# Patient Record
Sex: FEMALE | Race: OTHER | Employment: OTHER | URBAN - METROPOLITAN AREA
[De-identification: names, ages, dates, MRNs, and addresses within clinical notes are randomized per-mention and may not be internally consistent; named-entity substitution may affect disease eponyms.]

---

## 2018-05-12 RX ORDER — EPINEPHRINE 0.1 MG/ML
1 INJECTION INTRACARDIAC; INTRAVENOUS
Status: CANCELLED | OUTPATIENT
Start: 2018-05-12 | End: 2018-05-12

## 2018-05-12 RX ORDER — DEXTROMETHORPHAN/PSEUDOEPHED 2.5-7.5/.8
1.2 DROPS ORAL
Status: CANCELLED | OUTPATIENT
Start: 2018-05-12

## 2018-05-12 RX ORDER — ATROPINE SULFATE 0.1 MG/ML
0.5 INJECTION INTRAVENOUS
Status: CANCELLED | OUTPATIENT
Start: 2018-05-12 | End: 2018-05-12

## 2018-05-14 ENCOUNTER — HOSPITAL ENCOUNTER (OUTPATIENT)
Age: 73
Setting detail: OUTPATIENT SURGERY
Discharge: HOME | End: 2018-05-14
Attending: INTERNAL MEDICINE | Admitting: INTERNAL MEDICINE
Payer: MEDICARE

## 2018-05-14 VITALS
HEIGHT: 64 IN | BODY MASS INDEX: 42.34 KG/M2 | DIASTOLIC BLOOD PRESSURE: 59 MMHG | HEART RATE: 81 BPM | RESPIRATION RATE: 16 BRPM | WEIGHT: 248 LBS | TEMPERATURE: 95.6 F | SYSTOLIC BLOOD PRESSURE: 118 MMHG | OXYGEN SATURATION: 96 %

## 2018-05-14 PROCEDURE — G0500 MOD SEDAT ENDO SERVICE >5YRS: HCPCS | Performed by: INTERNAL MEDICINE

## 2018-05-14 PROCEDURE — 74011250636 HC RX REV CODE- 250/636: Performed by: INTERNAL MEDICINE

## 2018-05-14 PROCEDURE — 99153 MOD SED SAME PHYS/QHP EA: CPT | Performed by: INTERNAL MEDICINE

## 2018-05-14 PROCEDURE — 76040000007: Performed by: INTERNAL MEDICINE

## 2018-05-14 PROCEDURE — 77030013991 HC SNR POLYP ENDOSC BSC -A: Performed by: INTERNAL MEDICINE

## 2018-05-14 PROCEDURE — 88305 TISSUE EXAM BY PATHOLOGIST: CPT | Performed by: INTERNAL MEDICINE

## 2018-05-14 PROCEDURE — 77030020256 HC SOL INJ NACL 0.9%  500ML: Performed by: INTERNAL MEDICINE

## 2018-05-14 PROCEDURE — 74011250636 HC RX REV CODE- 250/636

## 2018-05-14 RX ORDER — LEVOTHYROXINE SODIUM 25 UG/1
25 TABLET ORAL
COMMUNITY

## 2018-05-14 RX ORDER — GUAIFENESIN 100 MG/5ML
81 LIQUID (ML) ORAL DAILY
COMMUNITY

## 2018-05-14 RX ORDER — FLUMAZENIL 0.1 MG/ML
0.2 INJECTION INTRAVENOUS
Status: DISCONTINUED | OUTPATIENT
Start: 2018-05-14 | End: 2018-05-14 | Stop reason: HOSPADM

## 2018-05-14 RX ORDER — SODIUM CHLORIDE 9 MG/ML
100 INJECTION, SOLUTION INTRAVENOUS CONTINUOUS
Status: DISCONTINUED | OUTPATIENT
Start: 2018-05-14 | End: 2018-05-14 | Stop reason: HOSPADM

## 2018-05-14 RX ORDER — EZETIMIBE 10 MG/1
10 TABLET ORAL DAILY
COMMUNITY

## 2018-05-14 RX ORDER — NALOXONE HYDROCHLORIDE 0.4 MG/ML
0.4 INJECTION, SOLUTION INTRAMUSCULAR; INTRAVENOUS; SUBCUTANEOUS
Status: DISCONTINUED | OUTPATIENT
Start: 2018-05-14 | End: 2018-05-14 | Stop reason: HOSPADM

## 2018-05-14 RX ORDER — FENTANYL CITRATE 50 UG/ML
100 INJECTION, SOLUTION INTRAMUSCULAR; INTRAVENOUS
Status: DISCONTINUED | OUTPATIENT
Start: 2018-05-14 | End: 2018-05-14 | Stop reason: HOSPADM

## 2018-05-14 RX ORDER — HYDROCHLOROTHIAZIDE 12.5 MG/1
12.5 CAPSULE ORAL DAILY
COMMUNITY

## 2018-05-14 RX ORDER — SIMVASTATIN 40 MG/1
40 TABLET, FILM COATED ORAL
COMMUNITY

## 2018-05-14 RX ORDER — MIDAZOLAM HYDROCHLORIDE 1 MG/ML
.5-5 INJECTION, SOLUTION INTRAMUSCULAR; INTRAVENOUS
Status: DISCONTINUED | OUTPATIENT
Start: 2018-05-14 | End: 2018-05-14 | Stop reason: HOSPADM

## 2018-05-14 RX ORDER — LISINOPRIL 20 MG/1
20 TABLET ORAL DAILY
COMMUNITY

## 2018-05-14 RX ADMIN — SODIUM CHLORIDE 100 ML/HR: 900 INJECTION, SOLUTION INTRAVENOUS at 10:04

## 2018-05-14 NOTE — DISCHARGE INSTRUCTIONS
Azul Ladd  [id number]  1945    COLON DISCHARGE INSTRUCTIONS    Discomfort:  Redness at IV site- apply warm compress to area; if redness or soreness persist- contact your physician  There may be a slight amount of blood passed from the rectum  Gaseous discomfort- walking, belching will help relieve any discomfort  You may not operate a vehicle til the next day. You may not engage in an occupation involving machinery or appliances til the next day. You may not drink alcoholic beverages til the next day. DIET:   High fiber diet. ACTIVITY:  You may not  resume your normal daily activities til the next day. it is recommended that you spend the remainder of the day resting -  avoid any strenuous activity. CALL M.D.  IF ANY SIGN OF:   Increasing pain, nausea, vomiting  Abdominal distension (swelling)  New increased bleeding (oral or rectal)  Fever (chills)  Pain in chest area  Bloody discharge from nose or mouth  Shortness of breath    You may not  take any Advil, Aspirin, Ibuprofen, Motrin, Aleve, or Goodys for 5 days, ONLY  Tylenol as needed for pain. Post procedure diagnosis:  DIVERTICULOSIS, TORTUOUS COLON, POLYPS, HEMORRHOIDS    Follow-up Instructions: Your follow up colonoscopy will be in 5 years. We will notify you the results of your biopsy by letter within 2 weeks. Hemant Marin MD  May 14, 2018         DISCHARGE SUMMARY from Nurse    PATIENT INSTRUCTIONS:    After general anesthesia or intravenous sedation, for 24 hours or while taking prescription Narcotics:  · Limit your activities  · Do not drive and operate hazardous machinery  · Do not make important personal or business decisions  · Do  not drink alcoholic beverages  · If you have not urinated within 8 hours after discharge, please contact your surgeon on call.     Report the following to your surgeon:  · Excessive pain, swelling, redness or odor of or around the surgical area  · Temperature over 100.5  · Nausea and vomiting lasting longer than 4 hours or if unable to take medications  · Any signs of decreased circulation or nerve impairment to extremity: change in color, persistent  numbness, tingling, coldness or increase pain  · Any questions    What to do at Home:  Recommended activity: as above. If you experience any of the following symptoms as above, please follow up with Dr. Ravindra Ramos. *  Please give a list of your current medications to your Primary Care Provider. *  Please update this list whenever your medications are discontinued, doses are      changed, or new medications (including over-the-counter products) are added. *  Please carry medication information at all times in case of emergency situations. These are general instructions for a healthy lifestyle:    No smoking/ No tobacco products/ Avoid exposure to second hand smoke  Surgeon General's Warning:  Quitting smoking now greatly reduces serious risk to your health. Obesity, smoking, and sedentary lifestyle greatly increases your risk for illness    A healthy diet, regular physical exercise & weight monitoring are important for maintaining a healthy lifestyle    You may be retaining fluid if you have a history of heart failure or if you experience any of the following symptoms:  Weight gain of 3 pounds or more overnight or 5 pounds in a week, increased swelling in our hands or feet or shortness of breath while lying flat in bed. Please call your doctor as soon as you notice any of these symptoms; do not wait until your next office visit. Recognize signs and symptoms of STROKE:    F-face looks uneven    A-arms unable to move or move unevenly    S-speech slurred or non-existent    T-time-call 911 as soon as signs and symptoms begin-DO NOT go       Back to bed or wait to see if you get better-TIME IS BRAIN. Warning Signs of HEART ATTACK     Call 911 if you have these symptoms:   Chest discomfort.  Most heart attacks involve discomfort in the center of the chest that lasts more than a few minutes, or that goes away and comes back. It can feel like uncomfortable pressure, squeezing, fullness, or pain.  Discomfort in other areas of the upper body. Symptoms can include pain or discomfort in one or both arms, the back, neck, jaw, or stomach.  Shortness of breath with or without chest discomfort.  Other signs may include breaking out in a cold sweat, nausea, or lightheadedness. Don't wait more than five minutes to call 911 - MINUTES MATTER! Fast action can save your life. Calling 911 is almost always the fastest way to get lifesaving treatment. Emergency Medical Services staff can begin treatment when they arrive -- up to an hour sooner than if someone gets to the hospital by car. The discharge information has been reviewed with the patient and caregiver. The patient and caregiver verbalized understanding. Discharge medications reviewed with the patient and caregiver and appropriate educational materials and side effects teaching were provided.   ___________________________________________________________________________________________________________________________________    Patient armband removed and shredded

## 2018-05-14 NOTE — IP AVS SNAPSHOT
[address] [address] 
[phone] Patient: Liban Rivers MRN: [id number]:[id number] About your hospitalization You were admitted on:  May 14, 2018 You last received care in the:  [facility] ENDOSCOPY You were discharged on:  May 14, 2018 Why you were hospitalized Your primary diagnosis was:  Not on File Follow-up Information None Discharge Orders None A check adrian indicates which time of day the medication should be taken. My Medications ASK your doctor about these medications Instructions Each Dose to Equal  
 Morning Noon Evening Bedtime  
 aspirin 81 mg chewable tablet Your last dose was: Your next dose is: Take 81 mg by mouth daily. 81 mg  
    
   
   
   
  
 hydroCHLOROthiazide 12.5 mg capsule Commonly known as:  Don Brand Your last dose was: Your next dose is: Take 12.5 mg by mouth daily. 12.5 mg  
    
   
   
   
  
 levothyroxine 25 mcg tablet Commonly known as:  SYNTHROID Your last dose was: Your next dose is: Take 25 mcg by mouth Daily (before breakfast). 25 mcg  
    
   
   
   
  
 lisinopril 20 mg tablet Commonly known as:  Pecola Cypher Your last dose was: Your next dose is: Take 20 mg by mouth daily. 20 mg  
    
   
   
   
  
 ZETIA 10 mg tablet Generic drug:  ezetimibe Your last dose was: Your next dose is: Take 10 mg by mouth daily. 10 mg  
    
   
   
   
  
 ZOCOR 40 mg tablet Generic drug:  simvastatin Your last dose was: Your next dose is: Take 40 mg by mouth nightly. 40 mg Discharge Instructions Liban Rivers [id number] 1945 COLON DISCHARGE INSTRUCTIONS Discomfort: Redness at IV site- apply warm compress to area; if redness or soreness persist- contact your physician There may be a slight amount of blood passed from the rectum Gaseous discomfort- walking, belching will help relieve any discomfort You may not operate a vehicle til the next day. You may not engage in an occupation involving machinery or appliances til the next day. You may not drink alcoholic beverages til the next day. DIET: 
 High fiber diet. ACTIVITY: 
You may not  resume your normal daily activities til the next day. it is recommended that you spend the remainder of the day resting -  avoid any strenuous activity. CALL CHERYL Bryan ANY SIGN OF: Increasing pain, nausea, vomiting Abdominal distension (swelling) New increased bleeding (oral or rectal) Fever (chills) Pain in chest area Bloody discharge from nose or mouth Shortness of breath You may not  take any Advil, Aspirin, Ibuprofen, Motrin, Aleve, or Goodys for 5 days, ONLY  Tylenol as needed for pain. Post procedure diagnosis:  DIVERTICULOSIS, TORTUOUS COLON, POLYPS, HEMORRHOIDS Follow-up Instructions: Your follow up colonoscopy will be in 5 years. We will notify you the results of your biopsy by letter within 2 weeks. Burak Noel MD 
May 14, 2018 DISCHARGE SUMMARY from Nurse PATIENT INSTRUCTIONS: 
 
 
F-face looks uneven A-arms unable to move or move unevenly S-speech slurred or non-existent T-time-call 911 as soon as signs and symptoms begin-DO NOT go Back to bed or wait to see if you get better-TIME IS BRAIN. Warning Signs of HEART ATTACK Call 911 if you have these symptoms: 
? Chest discomfort.  Most heart attacks involve discomfort in the center of the chest that lasts more than a few minutes, or that goes away and comes back. It can feel like uncomfortable pressure, squeezing, fullness, or pain. ? Discomfort in other areas of the upper body. Symptoms can include pain or discomfort in one or both arms, the back, neck, jaw, or stomach. ? Shortness of breath with or without chest discomfort. ? Other signs may include breaking out in a cold sweat, nausea, or lightheadedness. Don't wait more than five minutes to call [address]! Fast action can save your life. Calling 911 is almost always the fastest way to get lifesaving treatment. Emergency Medical Services staff can begin treatment when they arrive  up to an hour sooner than if someone gets to the hospital by car. The discharge information has been reviewed with the patient and caregiver. The patient and caregiver verbalized understanding. Discharge medications reviewed with the patient and caregiver and appropriate educational materials and side effects teaching were provided. ___________________________________________________________________________________________________________________________________ Patient armband removed and shredded Introducing [facility]! [employer] introduces [employer] patient portal. Now you can access parts of your medical record, email your doctor's office, and request medication refills online. 1. In your internet browser, go to [email or web address] 2. Click on the First Time User? Click Here link in the Sign In box. You will see the New Member Sign Up page. 3. Enter your [facility] Access Code exactly as it appears below. You will not need to use this code after youve completed the sign-up process. If you do not sign up before the expiration date, you must request a new code. · [facility] Access Code: [email or web address] Expires: 8/12/2018 11:19 AM 
 
 4. Enter the last four digits of your Social Security Number (xxxx) and Date of Birth (mm/dd/yyyy) as indicated and click Submit. You will be taken to the next sign-up page. 5. Create a [employer] ID. This will be your [employer] login ID and cannot be changed, so think of one that is secure and easy to remember. 6. Create a [employer] password. You can change your password at any time. 7. Enter your Password Reset Question and Answer. This can be used at a later time if you forget your password. 8. Enter your e-mail address. You will receive e-mail notification when new information is available in [address] 9. Click Sign Up. You can now view and download portions of your medical record. 10. Click the Download Summary menu link to download a portable copy of your medical information. If you have questions, please visit the Frequently Asked Questions section of the [employer] website. Remember, [employer] is NOT to be used for urgent needs. For medical emergencies, dial 911. Now available from your iPhone and Android! Introducing Jesus Lopez As a [facility] patient, I wanted to make you aware of our electronic visit tool called Jesus Lopez. [facility] 24/7 allows you to connect within minutes with a medical provider 24 hours a day, seven days a week via a mobile device or tablet or logging into a secure website from your computer. You can access Jesus Henryfelicityfin from anywhere in the [place]. A virtual visit might be right for you when you have a simple condition and feel like you just dont want to get out of bed, or cant get away from work for an appointment, when your regular [facility] provider is not available (evenings, weekends or holidays), or when youre out of town and need minor care.   Electronic visits cost only $49 and if the Jesus Henryav provider determines a prescription is needed to treat your condition, one can be electronically transmitted to a nearby pharmacy*. Please take a moment to enroll today if you have not already done so. The enrollment process is free and takes just a few minutes. To enroll, please download the [address] [place] [address] 24/7 cinda to your tablet or phone, or visit [email or web address] to enroll on your computer. And, as an [address] [place] [address] patient with a [employer] account, the results of your visits will be scanned into your electronic medical record and your primary care provider will be able to view the scanned results. We urge you to continue to see your regular [address] [place] [address] provider for your ongoing medical care. And while your primary care provider may not be the one available when you seek a [employer] virtual visit, the peace of mind you get from getting a real diagnosis real time can be priceless. For more information on [employer], view our Frequently Asked Questions (FAQs) at [email or web address]. Sincerely, 
 
Janis Saeed MD 
Chief Medical Officer [employer] *:  certain medications cannot be prescribed via [employer] Providers Seen During Your Hospitalization Provider Specialty Primary office phone Rebecca Dalton MD Gastroenterology [phone] Your Primary Care Physician (PCP) Primary Care Physician Office Phone Office Fax [address], [address] [facility] [phone] You are allergic to the following Allergen Reactions Percocet (Oxycodone-Acetaminophen) Nausea and Vomiting Recent Documentation Height Weight Breastfeeding? BMI OB Status Smoking Status 1.626 m 112.5 kg No 42.57 kg/m2 Postmenopausal Passive Smoke Exposure - Never Smoker Emergency Contacts Name Discharge Info Relation Home Work Mobile Nirali Hernandez DISCHARGE CAREGIVER [3] Daughter [21]   [phone] Patient Belongings The following personal items are in your possession at time of discharge: 
  Dental Appliances: None  Visual Aid: Glasses Please provide this summary of care documentation to your next provider. Signatures-by signing, you are acknowledging that this After Visit Summary has been reviewed with you and you have received a copy. Patient Signature:  ____________________________________________________________ Date:  ____________________________________________________________  
  
Caleb Ala Provider Signature:  ____________________________________________________________ Date:  ____________________________________________________________

## 2018-05-14 NOTE — PROCEDURES
[facility]  Colonoscopy Procedure Report  _______________________________________________________  Patient: Adalid Lancaster                                         Attending Physician: Karel Carrasco MD    Patient ID: [id number]                                      Referring Physician: Winter Soria MD    Exam Date: May 14, 2018 _______________________________________________________      Introduction: A  67 y.o. female patient, presents for outpatient Colonoscopy    Indications:   68 y/o female referred by Dr. Roosevelt Grimm III for rectal bleeding consult. History of tiny 2.5 mm polyp removed by Dr Azul Ortiz 1/6/ 2009 difficult sigmoid with moderate diverticulosis and fair bowel prep. No fx hx of colon cancer. She has has one bm every 3 to 4 days and has no family history of colon neoplasm. One time in the end of March after being constipated passed some blood on the tissue and had a dark stool and also another time a spot of blood on the stool. She used to be constipated every 3 to 4 days but almost daily since starting eating high fiber diet  Needs to have a colonoscopy to exclude a polyp or tumor? ??     Consent: The benefits, risks, and alternatives to the procedure were discussed and informed consent was obtained from the patient. Preparation: EKG, pulse, pulse oximetry and blood pressure were monitored throughout the procedure. ASA Classification: Class 2 - . The heart is an S1-S2 and regular heart rate and rhythm. Lungs are clear to auscultation and percussion. Abdomen is obese, soft, nondistended, and nontender. Mental Status: awake, alert, and oriented to person, place, and time    Medications:  · Fentanyl 150 mcg IV and Versed 10 mg IV throughout the procedure. Rectal Exam: Slightly decreased anal sphincter tone. No Blood. Pathology Specimens: 2 specimens removed. Procedure:   The colonoscope was passed with great difficulty through the anus under direct visualization and advanced to the cecum. The patient required positioning on the back and external counter presure to aid in the passage of the scope. The scope was withdrawn and the mucosa was carefully examined. The quality of the preparation was good. The views were very good. The patient's toleration of the procedure was fair. Total time is 25 minutes and withdrawal time is 13 minutes. Findings:    Rectum:   Small internal hemorrhoids. Sigmoid:   Very Tortuous and fixed sigmoid colon with moderate Sigmoid diverticulosis and muscular hypertonia. Descending Colon:   5 mm sessile polyp in the descending colon cold snared  Transverse Colon:   2 small 4 mm sessile polyps in the proximal transverse colon at the hepatic flexure both were cold snared . Ascending Colon:   Normal  Cecum:   Normal  Terminal Ileum:   Not entered      Unplanned Events: There were no unplanned events. Estimated Blood Loss: None  Impressions:  Slightly decreased anal sphincter tone. Small internal hemorrhoids. Difficult colonoscopy because of severe fixed Tortuosity of the sigmoid colon from adhesions with moderate Sigmoid diverticulosis and muscular hypertonia. 2 small 4 mm sessile polyps in the proximal transverse colon cold snared . 5 mm sessile polyp in the descending colon cold snared. Normal Mucosa. Complications: None; patient tolerated the procedure well. Recommendations:  · Discharge home when standard parameters are met. · Resume a high fiber diet. · Colonoscopy recommendation in 5 years.     Procedure Codes:    · Talia Encinas [[id number]]    Endoscope Information:  Model Number(s)    [id number]       Assistant: None      Signed By: Mili Edward MD Date: May 14, 2018

## 2018-05-14 NOTE — H&P
This 67year old female presents for Rectal Bleed. History of Present Illness:  1. Rectal Bleed      Onset: 2 months ago. Severity level is 3. Quality:  BRBPR w/ bowel movement, wipe-type in the toilet and some mucus. It occurs randomly. The problem is improving. Associated symptoms include abdominal pain. Pertinent negatives include change in bowel habits, constipation, decreased appetite, diarrhea, heartburn, nausea, vomiting and weight loss. Additional information: started the last weekend in March it happened twice in once day. She admits to hemorrhoids. , Cholecystectomy. ASA 81 mg daily for preventative. BM 1 to 3 times a day since she increased her fiber intake. Previously she had a BM once every 3 to 4 days with incomplete evacuation. She has ex-lax, Magnesium citrate, and Colace in the past for constipation. Colonoscopy 09 by Dr. Nallely Morales 1 polyp. found. B-Prostate CA, D-Breast, B-lung CA. No fm hx of IBD. She is borderline diabetic. She takes Aleve rarely.             PROBLEM LIST:  Problem Description Onset Date   Rectal hemorrhage 2018   Finding of body mass index 2018   Benign hypertension 2018   H/O lower GIT neoplasm 2018   Hypothyroidism 2014   Malignant essential hypertension 2014   Menopausal and postmenopausal disorders 2014   Mixed hyperlipidemia 2014   Disorder of bone and articular cartilage 2014   Impaired fasting glycaemia 2014   Osteoarthritis of knee 2014   Pain in limb 2014   BMI 40.0-44.9, adult 2014         PAST MEDICAL/SURGICAL HISTORY   (Detailed)    Disease/disorder Onset Date Management Date Comments     Cataract extraction       Knee replacement 2010       section 1970      Cholecystectomy 1967      Arthroscopy knee       DIAGNOSTICS HISTORY:  Test Ordered Interpretation Result completed   Abdominal/KUB 3 Views 2018     Test Ordered Ordering Comments Modifier   Abdominal/KUB 3 Views 2018       Family History:  (Detailed)  Relationship Family Member Name  Age at Death Condition Onset Age Cause of Death       Family history of Cancer  N       Family history of Heart disease  N       Family history of High Blood Preasure  N       Family history of Diabetes mellitus  N   Brother    Alcoholism  N   Brother    Diabetes mellitus  N   Daughter    Cancer  N   Father  Y  Cancer  Y   Father  Y 80   N   Father  Y 80 Alcoholism  N   Mother    Osteoarthritis  N   Mother    Diabetes mellitus  N   Mother    Hypertension  N   Sister    Cancer  N         Social History:  (Detailed)  Tobacco use reviewed. Preferred language is Alethea Lesch. MARITAL STATUS/FAMILY/SOCIAL SUPPORT  Currently . Tobacco use status: Never smoked tobacco.  Smoking status: Never smoker. SMOKING STATUS  Use Status Type Smoking Status Usage Per Day Years Used Total Pack Years   no/never  Never smoker          ALCOHOL  There is no history of alcohol use. CAFFEINE  The patient uses caffeine: soda and coffee. - 2 cups a day. Patient Status   Completed with information received for patient transitioning into care. Medication Reconciliation  Medications reconciled today.   Medication Reviewed  Adherence Medication Name Sig Desc Elsewhere Status   taking as directed lisinopril 20 mg tablet take 1 tablet by oral route  every day Y Verified   taking as directed ibuprofen 600 mg tablet take 1 tablet by oral route  every 12 hours as needed with food N Verified   taking as directed levothyroxine 100 mcg tablet take 1 tablet by oral route  every day N Verified   taking as directed EZETIMIBE 10 MG TABLET TAKE 1 TABLET BY ORAL ROUTE  EVERY DAY N Verified   taking as directed lisinopril 20 mg tablet take 1 tablet by oral route  every day N Verified   taking as directed SIMVASTATIN 40 MG TABLET TAKE 1 TABLET BY ORAL ROUTE  EVERY DAY IN THE EVENING N Verified taking as directed chlorthalidone 25 mg tablet take 1 tablet by oral route  every day N Verified   taking as directed Aspirin Low Dose 81 mg tablet,delayed release take 1 tablet by oral route  every day N Verified     Medications (added, continued or stopped this visit):  Started Medication Directions PRN Status PRN Reason Instruction Stopped   05/08/2018 Aspirin Low Dose 81 mg tablet,delayed release take 1 tablet by oral route  every day N      04/01/2014 Calcium 600 + D(3) 600 mg (1,500 mg)-400 unit tablet  N   05/09/2018 05/02/2018 chlorthalidone 25 mg tablet take 1 tablet by oral route  every day N   05/09/2018 04/24/2018 EZETIMIBE 10 MG TABLET TAKE 1 TABLET BY ORAL ROUTE  EVERY DAY N      08/29/2014 ibuprofen 600 mg tablet take 1 tablet by oral route  every 12 hours as needed with food N      10/26/2017 levothyroxine 100 mcg tablet take 1 tablet by oral route  every day N      03/05/2018 lisinopril 20 mg tablet take 1 tablet by oral route  every day N       lisinopril 20 mg tablet take 1 tablet by oral route  every day N      04/24/2018 SIMVASTATIN 40 MG TABLET TAKE 1 TABLET BY ORAL ROUTE  EVERY DAY IN THE EVENING N      05/09/2018 Trulance 3 mg tablet take 1 tablet by oral route  every day N   05/09/2018 05/09/2018 Trulance 3 mg tablet take 1 tablet by oral route  every day N      08/29/2014 Voltaren 1 % topical gel apply (2G)  by topical route 4 times every day to the affected area(s) N  5- 100gram tubes 05/09/2018     Allergies:  Ingredient Reaction Medication Name Comment   ACETAMINOPHEN      (Reviewed, no changes.)    ORDERS:  Status Lab Order Time Frame Comments   ordered GASTROENTEROLOGY PROCEDURE within 1 Month at location [address] surgeon scheduled is Kaushal Snyder MD. An assistant has not been requested. suprep given. Review of Systems  System Neg/Pos Details   Constitutional Positive Malaise. Constitutional Negative Chills, fever and weight loss.    ENMT Positive Nasal congestion, Sore throat. Eyes Negative Double vision. Respiratory Positive Chronic cough. Respiratory Negative Asthma and wheezing. Cardio Negative Chest pain, edema and irregular heartbeat/palpitations. GI Positive Abdominal pain, Melena. GI Negative Change in bowel habits, constipation, decreased appetite, diarrhea, dysphagia, heartburn, hematemesis, hematochezia, nausea, reflux and vomiting.  Negative Dysuria and hematuria. Endocrine Negative Cold intolerance, heat intolerance and increased thirst.   Neuro Negative Dizziness, headache, numbness, tremors and vertigo. Psych Negative Anxiety, depression and increased stress. Integumentary Positive Rash. Integumentary Negative Hives. MS Positive Back pain, Joint pain. MS Negative Myalgia. Hema/Lymph Negative Easy bleeding and easy bruising. Allergic/Immuno Positive Seasonal allergies. Allergic/Immuno Negative Contact allergy and food allergies. Vital Signs     Height  Time ft in cm Last Measured Height Position   2:49 PM 5.0 4.00 162.56 05/09/2018 0     Weight/BSA/BMI  Time lb oz kg Context BMI kg/m2 BSA m2   2:49 .00  114.305 dressed with shoes 43.26      Date/Time Temp Pulse BP MAP (Calculated) Arterial Line 1 BP (mmHg) BP Patient Position Resp SpO2 O2 Device O2 Flow Rate (L/min) Pre/Post Ductal Weight      05/14/18 0942 98.9 °F (37.2 °C) 94 120/60 80 -- -- 19 95 % Room air -- -- 112.5 kg (248 lb)             PHYSICAL EXAM:  Exam Findings Details   Constitutional * Nourishment - morbid obese. Constitutional Comments BMI 43   Constitutional Normal No acute distress. Well developed.    Eyes Normal General - Right: Normal, Left: Normal. Conjunctiva - Right: Normal, Left: Normal. Sclera - Right: Normal, Left: Normal. Pupil - Right: Normal, Left: Normal.   Nose/Mouth/Throat Normal Lips/teeth/gums - Normal. Tongue - Normal. Buccal mucosa - Normal. Palate & uvula - Normal.   Neck Exam Normal Inspection - Normal. Palpation - Normal. Thyroid gland - Normal. Submandibular lymph nodes - Normal.   Lymph Detail Normal Submandibular. Anterior cervical. Posterior cervical. Supraclavicular. Respiratory Normal Inspection - Normal. Auscultation - Normal. Percussion - Normal. Cough - Absent. Effort - Normal.   Cardiovascular Normal Heart rate - Regular rate. Heart sounds - Normal S1, Normal S2. Murmurs - None. Extremities - No edema. Abdomen * Obese. Inspection - rounded, RUQ oblic and midline lower abdominal, surgical scar(s). Hernia - Positive. Type: umbilical. Location: tiny. Abdomen Normal Appliance(s) - None. Abdominal muscles - Normal. Auscultation - Normal. Percussion - Normal. Anterior palpation - No guarding, No rebound. CVA tenderness - None. Umbilicus - Normal. No abdominal tenderness. No hepatic enlargement. No spleen enlargement. No ascites. No palpable mass. Todd's sign - Normal.   Skin Normal Inspection - Normal.   Musculoskeletal Normal Hands - Left: Normal, Right: Normal.   Extremity Normal No cyanosis. No edema. Clubbing - Absent. Neurological Normal Level of consciousness - Normal. Orientation - Normal. Memory - Normal. Motor - Normal. Balance & gait - Normal. Coordination - Normal. Fine motor skills - Normal. DTRs - Normal.   Psychiatric Normal Orientation - Oriented to time, place, person & situation. Not anxious. Appropriate mood and affect. Behavior appropriate for age. Sufficient fund of knowledge. Sufficient language. No memory loss. Assessment/Plan  # Detail Type Description    1. Assessment Rectal bleed (K62.5). Patient Plan 68 y/o female referred by Dr. Rona Leger III for rectal bleeding consult. History of polyps removed by Dr Shameka Phillips last 8 years ago. No fx hx of colon cancer  one time in the end of March after being constipated passed some blood on the tissue and had a dark stool and also another time a spot of blood on the stool.   She used to be constipated every 3 to 4 days but almost daily since starting eating high fiber diet  Needs to have a colonoscopy to exclude a polyp or tumor? ??    Plan Orders She will be scheduled for GASTROENTEROLOGY PROCEDURE within 1 Month on 05/14/2018.         2. Assessment Essential (primary) hypertension (I10). Patient Plan Onset 20 years ago controlled with Lisinopril. 3. Assessment Personal history of colonic polyps (Z86.010). Patient Plan one tiny 2.5 mm polyp removed by Dr Azul Ortiz in 1/ 2009 difficult sigmoid with moderate diverticulosis and fair prep   She is chronically constipated has one bm every 3 to 4 days and has no family history of colon neoplasm. Will book for her third fu colonoscopy. I explained to the patient the procedure of colonoscopy and the risks involved which includes but not limited to bleeding, perforation, infection or missing a lesion if the bowels are not well clean or are unusually tortuous and difficult. I gave her the Suprep. I advised her to take extra laxatives for few days before r the procedure. I  answered her questions. She was agreeable to proceed with the procedure  she had open cholecystectomy, c section and tiny umbilical hernia so she is going to be difficult         4. Assessment BMI 40.0-44.9, adult (Z68.41). Patient Plan BMI 43 told her she needs to watch diet         5. Assessment Slow transit constipation (K59.01).     Patient Plan chronic constipation would start her on Trulance but if not approved she should go with MiraLax daily or more told her about diet and water too

## 2020-01-12 PROBLEM — R30.0 DYSURIA: Status: ACTIVE | Noted: 2020-01-12

## 2020-01-12 PROBLEM — R35.0 URINARY FREQUENCY: Status: ACTIVE | Noted: 2020-01-12

## (undated) DEVICE — WRISTBAND ID AD W2.5XL9.5CM RED VYN ADH CLSR UNI-PRINT

## (undated) DEVICE — SINGLE PORT MANIFOLD: Brand: NEPTUNE 2

## (undated) DEVICE — SPONGE GZ W4XL4IN RAYON POLY 4 PLY NONWOVEN FASTER WICKING

## (undated) DEVICE — SYRINGE 50ML E/T

## (undated) DEVICE — SOLUTION IV 500ML 0.9% SOD CHL FLX CONT

## (undated) DEVICE — SYR 5ML 1/5 GRAD LL NSAF LF --

## (undated) DEVICE — MEDI-VAC NON-CONDUCTIVE SUCTION TUBING: Brand: CARDINAL HEALTH

## (undated) DEVICE — MAJ-1414 SINGLE USE ADPATER BIOPSY VALV: Brand: SINGLE USE ADAPTOR BIOPSY VALVE

## (undated) DEVICE — SPONGE GZ W4XL4IN COT 12 PLY TYP VII WVN C FLD DSGN

## (undated) DEVICE — ENDO CARRY-ON PROCEDURE KIT INCLUDES ENZYMATIC SPONGE, GAUZE, BIOHAZARD LABEL, TRAY, LUBRICANT, DIRTY SCOPE LABEL, WATER LABEL, TRAY, DRAWSTRING PAD, AND DEFENDO 4-PIECE KIT.: Brand: ENDO CARRY-ON PROCEDURE KIT

## (undated) DEVICE — CANNULA CUSH AD W/ 14FT TBG

## (undated) DEVICE — KENDALL RADIOLUCENT FOAM MONITORING ELECTRODE RECTANGULAR SHAPE: Brand: KENDALL

## (undated) DEVICE — TRAP SPEC COLL POLYP POLYSTYR --

## (undated) DEVICE — SET ADMIN 16ML TBNG L100IN 2 Y INJ SITE IV PIGGY BK DISP

## (undated) DEVICE — SYR 3ML LL TIP 1/10ML GRAD --

## (undated) DEVICE — SNARE POLYP SM W13MMXL240CM SHTH DIA2.4MM OVL FLX DISP

## (undated) DEVICE — CATH SUC CTRL PRT TRIFLO 14FR --

## (undated) DEVICE — NDL FLTR TIP 5 MIC 18GX1.5IN --

## (undated) DEVICE — TRNQT TEXT 1X18IN BLU LF DISP -- CONVERT TO ITEM 362165

## (undated) DEVICE — NDL PRT INJ NSAF BLNT 18GX1.5 --

## (undated) DEVICE — CATH IV SAFE STR 22GX1IN BLU -- PROTECTIV PLUS